# Patient Record
Sex: MALE | Race: WHITE | NOT HISPANIC OR LATINO | Employment: FULL TIME | URBAN - NONMETROPOLITAN AREA
[De-identification: names, ages, dates, MRNs, and addresses within clinical notes are randomized per-mention and may not be internally consistent; named-entity substitution may affect disease eponyms.]

---

## 2017-01-04 ENCOUNTER — OFFICE VISIT (OUTPATIENT)
Dept: OTOLARYNGOLOGY | Facility: CLINIC | Age: 27
End: 2017-01-04

## 2017-01-04 VITALS
WEIGHT: 215 LBS | HEIGHT: 73 IN | BODY MASS INDEX: 28.49 KG/M2 | HEART RATE: 73 BPM | TEMPERATURE: 97.7 F | SYSTOLIC BLOOD PRESSURE: 126 MMHG | DIASTOLIC BLOOD PRESSURE: 65 MMHG | RESPIRATION RATE: 17 BRPM

## 2017-01-04 DIAGNOSIS — M95.0 ACQUIRED NASAL DEFORMITY: Primary | ICD-10-CM

## 2017-01-04 DIAGNOSIS — J34.89 INTRANASAL SYNECHIAE: ICD-10-CM

## 2017-01-04 PROCEDURE — 99024 POSTOP FOLLOW-UP VISIT: CPT | Performed by: OTOLARYNGOLOGY

## 2017-01-04 NOTE — PATIENT INSTRUCTIONS
The left intranasal sneeze she was lysed.  This may recur, but he has had no symptoms.  She developed nasal obstruction, crusting, bleeding, call for follow-up.

## 2017-01-04 NOTE — PROGRESS NOTES
PRIMARY CARE PROVIDER: Ankit Espinal MD  REFERRING PROVIDER: No ref. provider found    Chief Complaint   Patient presents with   • Follow-up     SEPTAL DEVIATION       Subjective   History of Present Illness:  Yves Acevedo is a  26 y.o.  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 The patient has had a relatively normal postoperative course and currently has no related complaints.  Denies pain or nasal obstruction.    Review of Systems:  Review of Systems   HENT: Negative for congestion, nosebleeds and sinus pressure.        Past History:  Past Medical History   Diagnosis Date   • Acquired deformity of nose    • History of migraine    • Hypertrophy of inferior nasal turbinate    • Nasal septal deviation    • Sinus infection      FREQUENT   • Vision decreased      GLASSES     Past Surgical History   Procedure Laterality Date   • Septoplasty     • Tonsillectomy     • Septoplasty, resection inferior turbinates Bilateral 11/15/2016     Procedure: SEPTOPLASTY (69987), RESECTION INFERIOR TURBINATES (99220), REPAIR OF NASAL VESTIBULAR STENOSIS WITH OSTEOTOMIES WITH EVALUATION AND LIKELY REPLACEMENT OF  GRAFTS (48829);  Surgeon: Jersey Verma MD;  Location: [facility] OR;  Service:      History reviewed. No pertinent family history.  Social History   Substance Use Topics   • Smoking status: Never Smoker   • Smokeless tobacco: None   • Alcohol use 3.0 oz/week     5 Cans of beer per week     Allergies:  Review of patient's allergies indicates no known allergies.  No current outpatient prescriptions on file.  No current facility-administered medications for this visit.       Objective     Vital Signs:  Temp:  [97.7 °F (36.5 °C)] 97.7 °F (36.5 °C)  Heart Rate:  [73] 73  Resp:  [17] 17  BP: (126)/(65) 126/65    Physical Exam:  Physical Exam   Constitutional: He is oriented to person, place, and time. He appears well-developed and well-nourished. He is  cooperative. No distress.   HENT:   Head: Normocephalic and atraumatic.   Right Ear: External ear normal.   Left Ear: External ear normal.   Nose: Nose normal.   Mouth/Throat: Oropharynx is clear and moist.   Nasal dorsum slightly off towards the left.  The nose is decongested and suctioned.  The septum is midline with a left-sided intranasal synechia that was lysed with Romero suction and cauterized with silver nitrate.   Eyes: Conjunctivae and EOM are normal. Pupils are equal, round, and reactive to light. Right eye exhibits no discharge. Left eye exhibits no discharge. No scleral icterus.   Neck: Normal range of motion. Neck supple. No JVD present. No tracheal deviation present. No thyromegaly present.   Pulmonary/Chest: Effort normal. No stridor.   Musculoskeletal: Normal range of motion. He exhibits no edema or deformity.   Neurological: He is alert and oriented to person, place, and time. He has normal strength. No cranial nerve deficit. Coordination normal.   Skin: Skin is warm and dry. No rash noted. He is not diaphoretic. No erythema. No pallor.   Psychiatric: He has a normal mood and affect. His speech is normal and behavior is normal. Judgment and thought content normal. Cognition and memory are normal.   Vitals reviewed.        Assessment   Assessment:  1. Acquired nasal deformity    2. Intranasal synechiae        Plan   Plan:      Patient Instructions   The left intranasal sneeze she was lysed.  This may recur, but he has had no symptoms.  She developed nasal obstruction, crusting, bleeding, call for follow-up.    No Follow-up on file.    My findings and recommendations were discussed and questions were answered.     Jersey Verma MD  01/04/17  10:58 AM

## 2017-01-04 NOTE — MR AVS SNAPSHOT
"                        Yves Acevedo   1/4/2017 10:15 AM   Office Visit    Dept Phone:  [phone]   Encounter #:  [id number]    Provider:  Jersey Verma MD   Department:  [facility]                Your Full Care Plan              Today's Medication Changes          These changes are accurate as of: 1/4/17 10:43 AM.  If you have any questions, ask your nurse or doctor.               Stop taking medication(s)listed here:     cephalexin 500 MG capsule   Commonly known as:  KEFLEX   Stopped by:  Jersey Verma MD           CLARITIN 10 MG capsule   Generic drug:  Loratadine   Stopped by:  Jersey Verma MD                      Your Updated Medication List      Notice  As of 1/4/2017 10:43 AM    You have not been prescribed any medications.            Medications to be Given to You by a Medical Professional       Instructions     None    Patient Instructions History      Upcoming Appointments     Visit Type Date Time Department    FOLLOW UP 1/4/2017 10:15 AM [facility]      MyChart Signup     Our records indicate that you have declined [facility] MyChart signup. If you would like to sign up for [facility], please email [email or web address] or call [phone] to obtain an activation code.             Other Info from Your Visit           Allergies     No Known Allergies      Reason for Visit     Follow-up SEPTAL DEVIATION      Vital Signs     Blood Pressure Pulse Temperature Respirations Height Weight    126/65 73 97.7 °F (36.5 °C) 17 73\" (185.4 cm) 215 lb (97.5 kg)    Body Mass Index Smoking Status                28.37 kg/m2 Never Smoker            "

## 2019-10-30 ENCOUNTER — OFFICE VISIT (OUTPATIENT)
Dept: OTOLARYNGOLOGY | Facility: CLINIC | Age: 29
End: 2019-10-30

## 2019-10-30 VITALS
WEIGHT: 222 LBS | RESPIRATION RATE: 16 BRPM | HEART RATE: 126 BPM | HEIGHT: 73 IN | DIASTOLIC BLOOD PRESSURE: 80 MMHG | BODY MASS INDEX: 29.42 KG/M2 | SYSTOLIC BLOOD PRESSURE: 125 MMHG | TEMPERATURE: 97.6 F

## 2019-10-30 DIAGNOSIS — M95.0 ACQUIRED NASAL DEFORMITY: Primary | ICD-10-CM

## 2019-10-30 DIAGNOSIS — J30.9 ALLERGIC RHINITIS, UNSPECIFIED SEASONALITY, UNSPECIFIED TRIGGER: ICD-10-CM

## 2019-10-30 PROCEDURE — 99213 OFFICE O/P EST LOW 20 MIN: CPT | Performed by: OTOLARYNGOLOGY

## 2019-10-30 RX ORDER — SULFAMETHOXAZOLE AND TRIMETHOPRIM 800; 160 MG/1; MG/1
1 TABLET ORAL 2 TIMES DAILY
Refills: 0 | COMMUNITY
Start: 2019-10-28 | End: 2021-07-14

## 2019-10-30 RX ORDER — CITALOPRAM 10 MG/1
10 TABLET ORAL DAILY
COMMUNITY
End: 2023-02-09 | Stop reason: ALTCHOICE

## 2019-10-30 NOTE — PROGRESS NOTES
PRIMARY CARE PROVIDER: Ankit Espinal MD  REFERRING PROVIDER: No ref. provider found    Chief Complaint   Patient presents with   • Facial Injury       Subjective   History of Present Illness:  Yves Acevedo is a  29 y.o. male  who presents with new complaints of recent trauma to the left side of his nose.  On 10/10/19, the patient's 9 month old daughter hit the left side of his nose with her head. He had immediate left sided nasal pain. This was severe in severity. This was worsened with palpation. The pain did improve with time. Two weeks following the injury, he states the left side of his nose and left nasal vestible became very swollen. He then began to have trouble breathing out of the left side of his nose. He thinks he may have caused the swelling of his nose by continued manipulation with Qtips. He was treated with Bactrim and Bactroban with some improvement in symptoms. He has had continued left sided nasal congestion despite taking Flonase and Zyrtec. He states the left sided nasal pain has improved, but it still remains tender. He states he was evaluated with an xray at [facility]- he reports this was normal.     He is s/p repair of nasal vestibular stenosis on 11/15/16.  Postoperatively, he was breathing well.  His nose was a little deflected.  The patient has had a relatively normal postoperative course.     Review of Systems:  Review of Systems   Constitutional: Negative for chills and fever.   HENT: Positive for congestion and facial swelling. Negative for ear pain, nosebleeds, rhinorrhea, sinus pressure and sinus pain.    Respiratory: Negative for cough and shortness of breath.    Gastrointestinal: Negative for diarrhea, nausea and vomiting.   Skin: Negative for wound.   Neurological: Negative for headaches.       Past History:  Past Medical History:   Diagnosis Date   • Acquired deformity of nose    • History of migraine    • Hypertrophy of inferior nasal turbinate    •  Nasal septal deviation    • Sinus infection     FREQUENT   • Vision decreased     GLASSES     Past Surgical History:   Procedure Laterality Date   • SEPTOPLASTY     • SEPTOPLASTY, RESECTION INFERIOR TURBINATES Bilateral 11/15/2016    Procedure: SEPTOPLASTY (03648), RESECTION INFERIOR TURBINATES (41528), REPAIR OF NASAL VESTIBULAR STENOSIS WITH OSTEOTOMIES WITH EVALUATION AND LIKELY REPLACEMENT OF  GRAFTS (07179);  Surgeon: Jersey Verma MD;  Location: [facility];  Service:    • TONSILLECTOMY       Family History   Problem Relation Age of Onset   • Abnormal EKG Mother    • Cancer Father      Social History     Tobacco Use   • Smoking status: Never Smoker   • Smokeless tobacco: Never Used   Substance Use Topics   • Alcohol use: Yes     Alcohol/week: 3.0 oz     Types: 5 Cans of beer per week     Frequency: 2-4 times a month   • Drug use: No     Allergies:  Patient has no known allergies.    Current Outpatient Medications:   •  citalopram (CeleXA) 10 MG tablet, Take 10 mg by mouth Daily., Disp: , Rfl:   •  sulfamethoxazole-trimethoprim (BACTRIM DS,SEPTRA DS) 800-160 MG per tablet, Take 1 tablet by mouth 2 (Two) Times a Day., Disp: , Rfl: 0    I have reviewed and edited the CC/HPI/ROS/PFSH/Allergy/Medication information entered into the note by the patient/ancillary staff to accurately document the details of this visit.    Objective     Vital Signs:  Temp:  [97.6 °F (36.4 °C)] 97.6 °F (36.4 °C)  Heart Rate:  [126] 126  Resp:  [16] 16  BP: (125)/(80) 125/80    Physical Exam:  Physical Exam   Constitutional: He is oriented to person, place, and time. He appears well-developed and well-nourished. He is cooperative. No distress.   HENT:   Head: Normocephalic and atraumatic.   Right Ear: External ear normal.   Left Ear: External ear normal.   Nose: Mucosal edema present.   Mouth/Throat: Uvula is midline and oropharynx is clear and moist.   Nasal dorsum slightly deflected to the left. Columella approximately 1-2 mm  to the right. Left ascending process of the maxilla is moderately swollen and tender to palpation.    Eyes: Conjunctivae, EOM and lids are normal. Pupils are equal, round, and reactive to light. Right eye exhibits no discharge. Left eye exhibits no discharge. No scleral icterus.   Neck: Normal range of motion and phonation normal. Neck supple. No tracheal deviation present.   Pulmonary/Chest: Effort normal. No stridor. No respiratory distress.   Musculoskeletal: Normal range of motion. He exhibits no edema or deformity.   Lymphadenopathy:     He has no cervical adenopathy.   Neurological: He is alert and oriented to person, place, and time. He has normal strength. No cranial nerve deficit. Coordination normal.   Skin: Skin is warm and dry. No lesion and no rash noted. He is not diaphoretic. No erythema. No pallor.   Psychiatric: He has a normal mood and affect. His speech is normal and behavior is normal. Judgment and thought content normal. Cognition and memory are normal.   Nursing note and vitals reviewed.      Assessment   Assessment:  1. Acquired nasal deformity    2. Allergic rhinitis, unspecified seasonality, unspecified trigger        Plan   Plan:    I would like to give this more time for his nose to heal and for the swelling to continue to decrease. He was instructed to continue Bactroban and Flonase. I would like for him to follow-up in 4 weeks for re-evaluation. His breathing is improving as the swelling and pain go down.  Call for any problems or worsening symptoms.     Return in about 4 weeks (around 11/27/2019), or if symptoms worsen or fail to improve, for Recheck.    My findings and recommendations were discussed and questions were answered.     Jersey Verma MD  10/30/19  5:38 PM

## 2021-06-15 ENCOUNTER — HOSPITAL ENCOUNTER (OUTPATIENT)
Dept: MRI IMAGING | Facility: HOSPITAL | Age: 31
Discharge: HOME | End: 2021-06-15
Admitting: INTERNAL MEDICINE

## 2021-06-15 DIAGNOSIS — H53.2 DOUBLE VISION: ICD-10-CM

## 2021-06-15 DIAGNOSIS — R51.9 NONINTRACTABLE HEADACHE, UNSPECIFIED CHRONICITY PATTERN, UNSPECIFIED HEADACHE TYPE: ICD-10-CM

## 2021-06-15 PROCEDURE — 25010000002 GADOTERIDOL PER 1 ML: Performed by: INTERNAL MEDICINE

## 2021-06-15 PROCEDURE — 70553 MRI BRAIN STEM W/O & W/DYE: CPT

## 2021-06-15 PROCEDURE — A9579 GAD-BASE MR CONTRAST NOS,1ML: HCPCS | Performed by: INTERNAL MEDICINE

## 2021-06-15 RX ADMIN — GADOTERIDOL 20 ML: 279.3 INJECTION, SOLUTION INTRAVENOUS at 12:31

## 2021-07-14 ENCOUNTER — APPOINTMENT (OUTPATIENT)
Dept: CT IMAGING | Facility: HOSPITAL | Age: 31
End: 2021-07-14

## 2021-07-14 ENCOUNTER — HOSPITAL ENCOUNTER (EMERGENCY)
Facility: HOSPITAL | Age: 31
Discharge: HOME | End: 2021-07-14
Attending: FAMILY MEDICINE | Admitting: EMERGENCY MEDICINE

## 2021-07-14 VITALS
BODY MASS INDEX: 30.62 KG/M2 | TEMPERATURE: 96.9 F | SYSTOLIC BLOOD PRESSURE: 126 MMHG | HEIGHT: 73 IN | DIASTOLIC BLOOD PRESSURE: 83 MMHG | WEIGHT: 231 LBS | OXYGEN SATURATION: 100 % | HEART RATE: 67 BPM | RESPIRATION RATE: 18 BRPM

## 2021-07-14 DIAGNOSIS — J01.00 ACUTE MAXILLARY SINUSITIS, RECURRENCE NOT SPECIFIED: Primary | ICD-10-CM

## 2021-07-14 DIAGNOSIS — G43.109 MIGRAINE WITH AURA AND WITHOUT STATUS MIGRAINOSUS, NOT INTRACTABLE: ICD-10-CM

## 2021-07-14 LAB
ALBUMIN SERPL-MCNC: 4.7 G/DL (ref 3.5–5.2)
ALBUMIN/GLOB SERPL: 1.4 G/DL
ALP SERPL-CCNC: 99 U/L (ref 39–117)
ALT SERPL W P-5'-P-CCNC: 32 U/L (ref 1–41)
AMPHET+METHAMPHET UR QL: NEGATIVE
AMPHETAMINES UR QL: NEGATIVE
ANION GAP SERPL CALCULATED.3IONS-SCNC: 5 MMOL/L (ref 5–15)
AST SERPL-CCNC: 21 U/L (ref 1–40)
BARBITURATES UR QL SCN: NEGATIVE
BASOPHILS # BLD AUTO: 0.03 10*3/MM3 (ref 0–0.2)
BASOPHILS NFR BLD AUTO: 0.4 % (ref 0–1.5)
BENZODIAZ UR QL SCN: NEGATIVE
BILIRUB SERPL-MCNC: 0.4 MG/DL (ref 0–1.2)
BUN SERPL-MCNC: 17 MG/DL (ref 6–20)
BUN/CREAT SERPL: 13.8 (ref 7–25)
BUPRENORPHINE SERPL-MCNC: NEGATIVE NG/ML
CALCIUM SPEC-SCNC: 9.3 MG/DL (ref 8.6–10.5)
CANNABINOIDS SERPL QL: NEGATIVE
CHLORIDE SERPL-SCNC: 98 MMOL/L (ref 98–107)
CO2 SERPL-SCNC: 30 MMOL/L (ref 22–29)
COCAINE UR QL: NEGATIVE
CREAT SERPL-MCNC: 1.23 MG/DL (ref 0.76–1.27)
DEPRECATED RDW RBC AUTO: 36.7 FL (ref 37–54)
EOSINOPHIL # BLD AUTO: 0.24 10*3/MM3 (ref 0–0.4)
EOSINOPHIL NFR BLD AUTO: 3.3 % (ref 0.3–6.2)
ERYTHROCYTE [DISTWIDTH] IN BLOOD BY AUTOMATED COUNT: 12.6 % (ref 12.3–15.4)
GFR SERPL CREATININE-BSD FRML MDRD: 68 ML/MIN/1.73
GLOBULIN UR ELPH-MCNC: 3.4 GM/DL
GLUCOSE SERPL-MCNC: 87 MG/DL (ref 65–99)
HCT VFR BLD AUTO: 43.9 % (ref 37.5–51)
HGB BLD-MCNC: 15 G/DL (ref 13–17.7)
HOLD SPECIMEN: NORMAL
IMM GRANULOCYTES # BLD AUTO: 0.03 10*3/MM3 (ref 0–0.05)
IMM GRANULOCYTES NFR BLD AUTO: 0.4 % (ref 0–0.5)
LYMPHOCYTES # BLD AUTO: 2.38 10*3/MM3 (ref 0.7–3.1)
LYMPHOCYTES NFR BLD AUTO: 33 % (ref 19.6–45.3)
MCH RBC QN AUTO: 27.8 PG (ref 26.6–33)
MCHC RBC AUTO-ENTMCNC: 34.2 G/DL (ref 31.5–35.7)
MCV RBC AUTO: 81.3 FL (ref 79–97)
METHADONE UR QL SCN: NEGATIVE
MONOCYTES # BLD AUTO: 0.93 10*3/MM3 (ref 0.1–0.9)
MONOCYTES NFR BLD AUTO: 12.9 % (ref 5–12)
NEUTROPHILS NFR BLD AUTO: 3.6 10*3/MM3 (ref 1.7–7)
NEUTROPHILS NFR BLD AUTO: 50 % (ref 42.7–76)
NRBC BLD AUTO-RTO: 0 /100 WBC (ref 0–0.2)
OPIATES UR QL: NEGATIVE
OXYCODONE UR QL SCN: NEGATIVE
PCP UR QL SCN: NEGATIVE
PLATELET # BLD AUTO: 291 10*3/MM3 (ref 140–450)
PMV BLD AUTO: 10.3 FL (ref 6–12)
POTASSIUM SERPL-SCNC: 4.1 MMOL/L (ref 3.5–5.2)
PROPOXYPH UR QL: NEGATIVE
PROT SERPL-MCNC: 8.1 G/DL (ref 6–8.5)
RBC # BLD AUTO: 5.4 10*6/MM3 (ref 4.14–5.8)
SODIUM SERPL-SCNC: 133 MMOL/L (ref 136–145)
TRICYCLICS UR QL SCN: NEGATIVE
WBC # BLD AUTO: 7.21 10*3/MM3 (ref 3.4–10.8)

## 2021-07-14 PROCEDURE — 70450 CT HEAD/BRAIN W/O DYE: CPT

## 2021-07-14 PROCEDURE — 99283 EMERGENCY DEPT VISIT LOW MDM: CPT

## 2021-07-14 PROCEDURE — 80306 DRUG TEST PRSMV INSTRMNT: CPT | Performed by: PHYSICIAN ASSISTANT

## 2021-07-14 PROCEDURE — 85025 COMPLETE CBC W/AUTO DIFF WBC: CPT | Performed by: PHYSICIAN ASSISTANT

## 2021-07-14 PROCEDURE — 80053 COMPREHEN METABOLIC PANEL: CPT | Performed by: PHYSICIAN ASSISTANT

## 2021-07-14 PROCEDURE — 70486 CT MAXILLOFACIAL W/O DYE: CPT

## 2021-07-14 RX ORDER — LEVOFLOXACIN 750 MG/1
750 TABLET ORAL DAILY
Qty: 7 TABLET | Refills: 0 | Status: SHIPPED | OUTPATIENT
Start: 2021-07-14 | End: 2021-07-21

## 2021-07-14 NOTE — ED PROVIDER NOTES
"Subjective   Patient was sent to the emergency department for sinus infection, AMS.  States he has a history of migraine with aura since 2008 where he is conscious and can hear/understand what is going on around him but is unable to act.  Describes this as \"it feels as if my body is shutting down.  He has had 3 episodes similar to this in the past 3 months lasting approximately 10-15 minutes a piece.  Patient endorses headache, bilateral sinus pressure, feeling something pop in his sinuses and leaking orange fluid from his nose since yesterday.  Hx of sinus surgery.  Recent normal brain MRI on 6/15/2021.  He recently moved here and has not established with Neurology/ENT.  He has an appointment with Dr Wylie 9/15/2021.        History provided by:  Patient   used: No    Sinusitis  Pain details:     Location:  Maxillary (right)    Duration:  2 days    Timing:  Constant  Associated symptoms: headaches and rhinorrhea    Associated symptoms: no chills, no fever, no shortness of breath, no sore throat and no wheezing        Review of Systems   Constitutional: Negative for chills and fever.   HENT: Positive for rhinorrhea, sinus pressure and sinus pain. Negative for sore throat and trouble swallowing.    Respiratory: Negative for shortness of breath and wheezing.    Genitourinary: Negative for dysuria and flank pain.   Musculoskeletal: Negative for back pain.   Skin: Negative for color change.   Allergic/Immunologic: Negative for immunocompromised state.   Neurological: Positive for headaches. Negative for syncope.   Hematological: Does not bruise/bleed easily.   Psychiatric/Behavioral: Negative for confusion.       Past Medical History:   Diagnosis Date   • Acquired deformity of nose    • History of migraine    • Hypertrophy of inferior nasal turbinate    • Nasal septal deviation    • Sinus infection     FREQUENT   • Vision decreased     GLASSES       No Known Allergies    Past Surgical History: " "  Procedure Laterality Date   • SEPTOPLASTY     • SEPTOPLASTY, RESECTION INFERIOR TURBINATES Bilateral 11/15/2016    Procedure: SEPTOPLASTY (73020), RESECTION INFERIOR TURBINATES (15879), REPAIR OF NASAL VESTIBULAR STENOSIS WITH OSTEOTOMIES WITH EVALUATION AND LIKELY REPLACEMENT OF  GRAFTS (60880);  Surgeon: Jersey Verma MD;  Location: [facility] OR;  Service:    • TONSILLECTOMY         Family History   Problem Relation Age of Onset   • Abnormal EKG Mother    • Cancer Father        Social History     Socioeconomic History   • Marital status:      Spouse name: Not on file   • Number of children: Not on file   • Years of education: Not on file   • Highest education level: Not on file   Tobacco Use   • Smoking status: Never Smoker   • Smokeless tobacco: Never Used   Substance and Sexual Activity   • Alcohol use: Yes     Alcohol/week: 5.0 standard drinks     Types: 5 Cans of beer per week   • Drug use: Yes     Types: Marijuana   • Sexual activity: Defer           Objective      /89 (BP Location: Right arm, Patient Position: Sitting)   Pulse 62   Temp 96.9 °F (36.1 °C) (Infrared)   Resp 18   Ht 185.4 cm (73\")   Wt 105 kg (231 lb)   SpO2 98%   BMI 30.48 kg/m²     Physical Exam  Vitals and nursing note reviewed.   Constitutional:       Appearance: Normal appearance.   HENT:      Head: Normocephalic and atraumatic.      Nose:      Comments: Dried yellow/orange color noted in surgical mask.  No halo.       Mouth/Throat:      Mouth: Mucous membranes are moist.   Eyes:      Conjunctiva/sclera: Conjunctivae normal.   Cardiovascular:      Rate and Rhythm: Normal rate.   Pulmonary:      Effort: Pulmonary effort is normal.   Skin:     General: Skin is warm.      Capillary Refill: Capillary refill takes less than 2 seconds.   Neurological:      General: No focal deficit present.      Mental Status: He is alert.   Psychiatric:         Mood and Affect: Mood normal.         Behavior: Behavior normal.         " Thought Content: Thought content normal.         Procedures           ED Course  ED Course as of Jul 14 2004 Wed Jul 14, 2021 1948 Patient's sinus drainage/tenderness started end of May (>28 days)  He is a treatment failure on Ceftin prescribed by PCP 2 weeks ago (not within 10 days of onset of symptoms).  Treatment appropriate according to MIPS criteria.      [HIEN]      ED Course User Index  [HIEN] Boris Valentino PA-C      Results for orders placed or performed during the hospital encounter of 07/14/21   Comprehensive Metabolic Panel    Specimen: Blood   Result Value Ref Range    Glucose 87 65 - 99 mg/dL    BUN 17 6 - 20 mg/dL    Creatinine 1.23 0.76 - 1.27 mg/dL    Sodium 133 (L) 136 - 145 mmol/L    Potassium 4.1 3.5 - 5.2 mmol/L    Chloride 98 98 - 107 mmol/L    CO2 30.0 (H) 22.0 - 29.0 mmol/L    Calcium 9.3 8.6 - 10.5 mg/dL    Total Protein 8.1 6.0 - 8.5 g/dL    Albumin 4.70 3.50 - 5.20 g/dL    ALT (SGPT) 32 1 - 41 U/L    AST (SGOT) 21 1 - 40 U/L    Alkaline Phosphatase 99 39 - 117 U/L    Total Bilirubin 0.4 0.0 - 1.2 mg/dL    eGFR Non African Amer 68 >60 mL/min/1.73    Globulin 3.4 gm/dL    A/G Ratio 1.4 g/dL    BUN/Creatinine Ratio 13.8 7.0 - 25.0    Anion Gap 5.0 5.0 - 15.0 mmol/L   Urine Drug Screen - Urine, Clean Catch    Specimen: Urine, Clean Catch   Result Value Ref Range    THC, Screen, Urine Negative Negative    Phencyclidine (PCP), Urine Negative Negative    Cocaine Screen, Urine Negative Negative    Methamphetamine, Ur Negative Negative    Opiate Screen Negative Negative    Amphetamine Screen, Urine Negative Negative    Benzodiazepine Screen, Urine Negative Negative    Tricyclic Antidepressants Screen Negative Negative    Methadone Screen, Urine Negative Negative    Barbiturates Screen, Urine Negative Negative    Oxycodone Screen, Urine Negative Negative    Propoxyphene Screen Negative Negative    Buprenorphine, Screen, Urine Negative Negative   CBC Auto Differential    Specimen: Blood    Result Value Ref Range    WBC 7.21 3.40 - 10.80 10*3/mm3    RBC 5.40 4.14 - 5.80 10*6/mm3    Hemoglobin 15.0 13.0 - 17.7 g/dL    Hematocrit 43.9 37.5 - 51.0 %    MCV 81.3 79.0 - 97.0 fL    MCH 27.8 26.6 - 33.0 pg    MCHC 34.2 31.5 - 35.7 g/dL    RDW 12.6 12.3 - 15.4 %    RDW-SD 36.7 (L) 37.0 - 54.0 fl    MPV 10.3 6.0 - 12.0 fL    Platelets 291 140 - 450 10*3/mm3    Neutrophil % 50.0 42.7 - 76.0 %    Lymphocyte % 33.0 19.6 - 45.3 %    Monocyte % 12.9 (H) 5.0 - 12.0 %    Eosinophil % 3.3 0.3 - 6.2 %    Basophil % 0.4 0.0 - 1.5 %    Immature Grans % 0.4 0.0 - 0.5 %    Neutrophils, Absolute 3.60 1.70 - 7.00 10*3/mm3    Lymphocytes, Absolute 2.38 0.70 - 3.10 10*3/mm3    Monocytes, Absolute 0.93 (H) 0.10 - 0.90 10*3/mm3    Eosinophils, Absolute 0.24 0.00 - 0.40 10*3/mm3    Basophils, Absolute 0.03 0.00 - 0.20 10*3/mm3    Immature Grans, Absolute 0.03 0.00 - 0.05 10*3/mm3    nRBC 0.0 0.0 - 0.2 /100 WBC     CT Head Without Contrast    Result Date: 7/14/2021  Narrative: CT HEAD WO CONTRAST (accession [id number]), CT SINUS WO CONTRAST (accession [id number]) RadLex: CT HEAD WITHOUT IV CONTRAST, CT SINUSES WITHOUT IV CONTRAST CLINICAL HISTORY: 31 years  Male;  headache TECHNOLOGIST NOTES: TECHNIQUE: Helical CT imaging performed without contrast. Axial, sagittal and coronal reformatted images are available for review.  This exam was performed according to our departmental dose-optimization program, which includes automated exposure control, adjustment of the mA and/or kV according to patient size and/or use of iterative reconstruction technique. COMPARISON: None currently available FINDINGS: Head: No acute intracranial hemorrhage. No mass effect, midline shift or hydrocephalus. The gray-white matter differentiation is grossly unremarkable. No evidence of acute large territory infarct.   Within the broad range of normal, brain volume is age appropriate. Sinuses: Moderately severe peripheral mucosal thickening in the maxillary  sinuses bilaterally. There is also mucosal thickening in the ethmoid sinuses bilaterally and the left frontal sinus. Nondisplaced bilateral nasal bone fractures, probably old. The mastoids are clear bilaterally.     Impression: 1.  Head: No evidence of acute traumatic intracranial injury 2.  Sinuses:  Peripheral mucosal thickening in the paranasal sinuses bilaterally. Electronically signed by:  Kunal Miller MD  7/14/2021 7:33 PM CDT Workstation: [id number]    MRI Brain With & Without Contrast    Result Date: 6/15/2021  Narrative: EXAM: MRI BRAIN W WO CONTRAST CLINICAL INDICATION: Headache COMPARISON: There is no previous study for comparison. TECHNIQUE: The MRI was done using sagittal T1-weighted images, axial T1-weighted, T2-weighted, FLAIR, diffusion-weighted, and gradient echo images, coronal T2, and postcontrast triplanar T1-weighted images including IV administration of 20 cc of ProHance.  FINDINGS: There is no midline shift, mass effect, or extra-axial fluid collection. There is no evidence of acute intracranial hemorrhage. The ventricles and cortical sulci are normal for the patient's age. Diffusion-weighted images are negative with no evidence of restricted diffusion to suggest acute infarct. The major intracranial flow voids are intact. Postcontrast images reveal no evidence of any mass lesion or abnormal postcontrast enhancement. Incidentally noted is mild mucosal thickening in the bilateral maxillary sinuses and left frontal sinus.     Impression: 1. Normal MRI of the brain. 2. Incidentally noted mild chronic sinus disease.  Electronically signed by:  Jan Roy MD  6/15/2021 7:33 PM CDT Workstation: [id number]    CT Sinus Without Contrast    Result Date: 7/14/2021  Narrative: CT HEAD WO CONTRAST (accession [id number]), CT SINUS WO CONTRAST (accession [id number]) RadLex: CT HEAD WITHOUT IV CONTRAST, CT SINUSES WITHOUT IV CONTRAST CLINICAL HISTORY: 31 years  Male;  headache TECHNOLOGIST NOTES: TECHNIQUE:  Helical CT imaging performed without contrast. Axial, sagittal and coronal reformatted images are available for review.  This exam was performed according to our departmental dose-optimization program, which includes automated exposure control, adjustment of the mA and/or kV according to patient size and/or use of iterative reconstruction technique. COMPARISON: None currently available FINDINGS: Head: No acute intracranial hemorrhage. No mass effect, midline shift or hydrocephalus. The gray-white matter differentiation is grossly unremarkable. No evidence of acute large territory infarct.   Within the broad range of normal, brain volume is age appropriate. Sinuses: Moderately severe peripheral mucosal thickening in the maxillary sinuses bilaterally. There is also mucosal thickening in the ethmoid sinuses bilaterally and the left frontal sinus. Nondisplaced bilateral nasal bone fractures, probably old. The mastoids are clear bilaterally.     Impression: 1.  Head: No evidence of acute traumatic intracranial injury 2.  Sinuses:  Peripheral mucosal thickening in the paranasal sinuses bilaterally. Electronically signed by:  Kunal Miller MD  7/14/2021 7:33 PM CDT Workstation: [id number]                                         [facility]    Final diagnoses:   Acute maxillary sinusitis, recurrence not specified   Migraine with aura and without status migrainosus, not intractable       ED Disposition  ED Disposition     ED Disposition Condition Comment    Discharge Stable           Ankit Espinal MD  [address] [place] [address]   [place] [address]  [phone]    Call in 1 day      [facility] EAR [facility]  [address] [place] [address] Dr  [facility] [address] [place] [id number] [phone]  Call in 1 day           Medication List      New Prescriptions    levoFLOXacin 750 MG tablet  Commonly known as: LEVAQUIN  Take 1 tablet by mouth Daily for 7 days.           Where to Get Your Medications      These  medications were sent to [facility][id number] - [place], [place] - [address] AT [facility] [address] - [phone]  - [phone] [address] [address]    Phone: [phone]   · levoFLOXacin 750 MG tablet          Boris Valentino PA-C  07/14/21 2004

## 2021-07-26 ENCOUNTER — OFFICE VISIT (OUTPATIENT)
Dept: OTOLARYNGOLOGY | Facility: CLINIC | Age: 31
End: 2021-07-26

## 2021-07-26 VITALS — OXYGEN SATURATION: 97 % | WEIGHT: 231.4 LBS | HEIGHT: 73 IN | BODY MASS INDEX: 30.67 KG/M2

## 2021-07-26 DIAGNOSIS — J32.4 CHRONIC PANSINUSITIS: Primary | ICD-10-CM

## 2021-07-26 PROCEDURE — 31231 NASAL ENDOSCOPY DX: CPT | Performed by: OTOLARYNGOLOGY

## 2021-07-26 RX ORDER — FLUTICASONE PROPIONATE 50 MCG
2 SPRAY, SUSPENSION (ML) NASAL DAILY
COMMUNITY
End: 2023-02-09 | Stop reason: ALTCHOICE

## 2021-07-26 RX ORDER — CETIRIZINE HYDROCHLORIDE 10 MG/1
10 TABLET ORAL DAILY
COMMUNITY
End: 2023-02-09 | Stop reason: ALTCHOICE

## 2021-08-23 ENCOUNTER — OFFICE VISIT (OUTPATIENT)
Dept: SLEEP MEDICINE | Facility: HOSPITAL | Age: 31
End: 2021-08-23

## 2021-08-23 VITALS
DIASTOLIC BLOOD PRESSURE: 76 MMHG | HEART RATE: 58 BPM | BODY MASS INDEX: 30.44 KG/M2 | OXYGEN SATURATION: 97 % | WEIGHT: 229.7 LBS | SYSTOLIC BLOOD PRESSURE: 120 MMHG | HEIGHT: 73 IN

## 2021-08-23 DIAGNOSIS — G47.10 HYPERSOMNIA: ICD-10-CM

## 2021-08-23 DIAGNOSIS — R06.83 SNORES: Primary | ICD-10-CM

## 2021-08-23 PROCEDURE — 99203 OFFICE O/P NEW LOW 30 MIN: CPT | Performed by: INTERNAL MEDICINE

## 2021-08-23 NOTE — PROGRESS NOTES
"      [facility]  [address]  [place], [place] [address]  Phone: [phone]  Fax: [phone]      New Patient Sleep Medicine Consultation  Sleep Visit Only    Encounter Date: 8/23/2021         Patient's PCP: Ankit Espinal MD  Referring provider: Clint Lee III, MD  Reason for consultation chief complaint: SATNAM    CHIEF COMPLAINT:  :   Chief Complaint   Patient presents with   • Fatigue     neck----16.5\"   • Daytime Sleepiness   • Morning Headaches   • Dry Mouth   • Snoring        Encounter Date: 8/23/2021           HISTORY OF PRESENT ILLNESS:  Referred by ENT, Dr. Keith RIDLEY for Sleep Evaluation for SATNAM   30 Years old man History of Chronic Sinus Disease since 2007, Seen by 4 ENT Physicians, patient with History 2 sinus procedures, some improvement as per patient information.  Patient with History of sinus infection with 3 courses of antibiotics last 3 months, Patient denied having nasal swabs for Infection testing.     Patient with a longstanding history of sinus disease and nasal vestibular stenosis, who had history of nasal septoplasty and multiple nasal procedures and seen by for ENT so far, patient with a family history of SATNAM, patient was referred for a sleep apnea evaluation.    Yves Acevedo is a 31 y.o. male whose bedtime is ~ 12 am. He  falls asleep after 10 minutes, and is up 1 times per night. He wakes up ~ 7 am. He endorses 7 hours of sleep. He drinks 1 cups of coffee, No hot teas, and 1 sodas per day. He drinks 3-4 alcoholic beverages per week. Patient sleeps with wife.    Yves Acevedo admits to snoring, unrestful sleep, excessive daytime sleepiness, morning headaches and sleepy driving. He denies cataplexy, sleep paralysis, or hypnagogic hallucinations. He does not take sedatives or hypnotics. He has some sleepiness with driving.he ahs never falling sleep while driving. He Does not nap due to lack of time. He has carpets in His " bedroom, No animals.  Patient exercises 3 times a week. Patient has never been tested for allergies.     Patient has a history Migraine, patient denies history of DVT, no history of pulmonary embolism, no history of asthma, no history of pneumonia.  Patient has carpets in his room and carpets is vacuumed once a week  Patient has 4 kids at home and he wakes up and help taking care of the kids    Family History SATNAM: father, Brother, uncles, Aunts    Brief Social History:  He is a never smoker.   Nicotine vaping: No  No POT smoker: Sometimes     Occupation:  Business Owner    PAST MEDICAL AND SURGICAL HISTORIES:    Past Medical History:   Diagnosis Date   • Acquired deformity of nose    • History of migraine    • Hypertrophy of inferior nasal turbinate    • Nasal septal deviation    • Sinus infection     FREQUENT   • Vision decreased     GLASSES     Past Surgical History:   Procedure Laterality Date   • SEPTOPLASTY     • SEPTOPLASTY, RESECTION INFERIOR TURBINATES Bilateral 11/15/2016    Procedure: SEPTOPLASTY (24548), RESECTION INFERIOR TURBINATES (92639), REPAIR OF NASAL VESTIBULAR STENOSIS WITH OSTEOTOMIES WITH EVALUATION AND LIKELY REPLACEMENT OF  GRAFTS (97548);  Surgeon: Jersey Verma MD;  Location: [facility] OR;  Service:    • TONSILLECTOMY         No Known Allergies      Family History   Problem Relation Age of Onset   • Abnormal EKG Mother    • Cancer Father      Social History     Socioeconomic History   • Marital status:      Spouse name: Not on file   • Number of children: Not on file   • Years of education: Not on file   • Highest education level: Not on file   Tobacco Use   • Smoking status: Never Smoker   • Smokeless tobacco: Never Used   Substance and Sexual Activity   • Alcohol use: Yes     Alcohol/week: 5.0 standard drinks     Types: 5 Cans of beer per week   • Drug use: Yes     Types: Marijuana   • Sexual activity: Defer     MEDICATIONS:    Current Outpatient Medications:   •  cetirizine  "(zyrTEC) 10 MG tablet, Take 10 mg by mouth Daily., Disp: , Rfl:   •  citalopram (CeleXA) 10 MG tablet, Take 10 mg by mouth Daily., Disp: , Rfl:   •  fluticasone (FLONASE) 50 MCG/ACT nasal spray, 2 sprays into the nostril(s) as directed by provider Daily., Disp: , Rfl:   •  ubrogepant (ubrogepant) 50 MG tablet, as needed, Disp: , Rfl:       Review of Systems   Constitutional: Positive for fatigue.   HENT: Positive for congestion, postnasal drip, sinus pressure and sinus pain. Negative for sneezing.    Eyes: Negative for pain.   Respiratory: Negative for shortness of breath.    Cardiovascular: Negative for leg swelling.   Gastrointestinal: Negative for blood in stool.   Endocrine: Negative for polydipsia.   Genitourinary: Negative for enuresis.   Musculoskeletal: Negative for arthralgias.   Skin: Negative for rash.   Allergic/Immunologic: Negative for environmental allergies.   Neurological: Negative for seizures.   Hematological: Does not bruise/bleed easily.   Psychiatric/Behavioral: Positive for sleep disturbance. Negative for self-injury.       OBJECTIVE:    Vitals:    08/23/21 0822   BP: 120/76   Pulse: 58   SpO2: 97%         08/23/21 0822   Weight: 104 kg (229 lb 11.2 oz)     Body mass index is 30.31 kg/m². Patient's Body mass index is 30.31 kg/m².    Neck circumference: 16.5\"  ESS: 12      PHYSICAL EXAM:          General: Alert. Cooperative. No acute distress.             Head:  Normocephalic. Symmetrical. Atraumatic.              Eyes: Sclera clear. No icterus.              Nose: Mild septal deviation. No drainage. No nasal Polyps, No Skin Ulcers             Neck:  Trachea midline, No Stridor, No Supraclavicular Adenopathy          Throat: No oral lesions. No thrush. Moist mucous membranes.    Tongue is normal    Dentition is good       Pharynx: Posterior pharyngeal pillars are narrow    Mallampati score of IV (only hard palate visible)    Pharynx is nonerythematous                     Lungs:  Clear to " auscultation bilaterally. No wheezes. No rhonchi. No rales. Respirations regular, even and unlabored.            Heart:  Regular rhythm and normal rate. Normal S1 and S2. No murmur.     Abdomen:  Soft, non-tender and non-distended. Normal bowel sounds. No masses.   Extremities:  No edema, no Clubbing, No Cyanosis, upper extremity pulses palpable              Skin: No jaundice or rash, no nasal bridge ulceration           Neuro: Moves all 4 extremities  Psychiatric: Normal mood and affect.      IMPRESSION AND RECOMMENDATIONS:  Pleasant 31-year-old gentleman referred by ENT for SATNAM evaluation, patient with a longstanding history of chronic sinus disease seen by ENT, patient nasal corticosteroids, family history of SATNAM, [place] Sleepiness Scale of 12.     1. Snores  - I have explained to the patient the relationship with chronic sinus disease, upper airway resistance syndrome and possible worsening of underlying SATNAM  - I will request home sleep studies and if it is nondiagnostic I will let ENT know to continue with his ENT management  - Home Sleep Study; Future    2. Hypersomnia  - We will request a home sleep study  - Home Sleep Study; Future    Printed information regarding SATNAM facts has been provided to the patient    Contraindications to home sleep test: none    FOLLOW UP:  Return in about 4 weeks (around 9/20/2021).     Thank you for letting us to participate in the care of your patient.        Marquise Bang MD, FACP, FCCP  Diplomate in Pulmonary & Sleep Medicine    This document has been electronically signed by Marquise Bang MD on August 23, 2021 09:15 CDT      Instructions provided as documented in the After Visit Summary.  The patient indicated understanding of the diagnosis and agreed with the plan of care    EMR Dragon/Transcription disclaimer:   Some of this note may be an electronic transcription/translation of spoken language to printed text. The electronic translation of spoken language may  permit erroneous, or at times, nonsensical words or phrases to be inadvertently transcribed; Although I have reviewed the note for such errors, some may still exist.      CC: Ankit Espinal MD          No ref. provider found

## 2021-09-08 ENCOUNTER — HOSPITAL ENCOUNTER (OUTPATIENT)
Dept: SLEEP MEDICINE | Facility: HOSPITAL | Age: 31
Discharge: HOME | End: 2021-09-08
Admitting: INTERNAL MEDICINE

## 2021-09-08 DIAGNOSIS — R06.83 SNORES: ICD-10-CM

## 2021-09-08 DIAGNOSIS — G47.10 HYPERSOMNIA: ICD-10-CM

## 2021-09-08 PROCEDURE — 95800 SLP STDY UNATTENDED: CPT

## 2021-09-08 PROCEDURE — 95800 SLP STDY UNATTENDED: CPT | Performed by: INTERNAL MEDICINE

## 2021-09-22 ENCOUNTER — OFFICE VISIT (OUTPATIENT)
Dept: SLEEP MEDICINE | Facility: HOSPITAL | Age: 31
End: 2021-09-22

## 2021-09-22 VITALS
WEIGHT: 231.6 LBS | BODY MASS INDEX: 30.56 KG/M2 | DIASTOLIC BLOOD PRESSURE: 71 MMHG | HEART RATE: 67 BPM | SYSTOLIC BLOOD PRESSURE: 122 MMHG | OXYGEN SATURATION: 98 %

## 2021-09-22 DIAGNOSIS — G47.33 OSA (OBSTRUCTIVE SLEEP APNEA): Primary | ICD-10-CM

## 2021-09-22 PROCEDURE — 99212 OFFICE O/P EST SF 10 MIN: CPT | Performed by: NURSE PRACTITIONER

## 2021-09-22 NOTE — PATIENT INSTRUCTIONS
Obesity, Adult  Obesity is the condition of having too much total body fat. Being overweight or obese means that your weight is greater than what is considered healthy for your body size. Obesity is determined by a measurement called BMI. BMI is an estimate of body fat and is calculated from height and weight. For adults, a BMI of 30 or higher is considered obese.  Obesity can lead to other health concerns and major illnesses, including:  · Stroke.  · Coronary artery disease (CAD).  · Type 2 diabetes.  · Some types of cancer, including cancers of the colon, breast, uterus, and gallbladder.  · Osteoarthritis.  · High blood pressure (hypertension).  · High cholesterol.  · Sleep apnea.  · Gallbladder stones.  · Infertility problems.  What are the causes?  Common causes of this condition include:  · Eating daily meals that are high in calories, sugar, and fat.  · Being born with genes that may make you more likely to become obese.  · Having a medical condition that causes obesity, including:  ? Hypothyroidism.  ? Polycystic ovarian syndrome (PCOS).  ? Binge-eating disorder.  ? Cushing syndrome.  · Taking certain medicines, such as steroids, antidepressants, and seizure medicines.  · Not being physically active (sedentary lifestyle).  · Not getting enough sleep.  · Drinking high amounts of sugar-sweetened beverages, such as soft drinks.  What increases the risk?  The following factors may make you more likely to develop this condition:  · Having a family history of obesity.  · Being a woman of  descent.  · Being a man of  descent.  · Living in an area with limited access to:  ? [place], recreation centers, or sidewalks.  ? Healthy food choices, such as grocery stores and farmers' markets.  What are the signs or symptoms?  The main sign of this condition is having too much body fat.  How is this diagnosed?  This condition is diagnosed based on:  · Your BMI. If you are an adult with a BMI of 30 or  higher, you are considered obese.  · Your waist circumference. This measures the distance around your waistline.  · Your skinfold thickness. Your health care provider may gently pinch a fold of your skin and measure it.  You may have other tests to check for underlying conditions.  How is this treated?  Treatment for this condition often includes changing your lifestyle. Treatment may include some or all of the following:  · Dietary changes. This may include developing a healthy meal plan.  · Regular physical activity. This may include activity that causes your heart to beat faster (aerobic exercise) and strength training. Work with your health care provider to design an exercise program that works for you.  · Medicine to help you lose weight if you are unable to lose 1 pound a week after 6 weeks of healthy eating and more physical activity.  · Treating conditions that cause the obesity (underlying conditions).  · Surgery. Surgical options may include gastric banding and gastric bypass. Surgery may be done if:  ? Other treatments have not helped to improve your condition.  ? You have a BMI of 40 or higher.  ? You have life-threatening health problems related to obesity.  Follow these instructions at home:  Eating and drinking    · Follow recommendations from your health care provider about what you eat and drink. Your health care provider may advise you to:  ? Limit fast food, sweets, and processed snack foods.  ? Choose low-fat options, such as low-fat milk instead of whole milk.  ? Eat 5 or more servings of fruits or vegetables every day.  ? Eat at home more often. This gives you more control over what you eat.  ? Choose healthy foods when you eat out.  ? Learn to read food labels. This will help you understand how much food is considered 1 serving.  ? Learn what a healthy serving size is.  ? Keep low-fat snacks available.  ? Limit sugary drinks, such as soda, fruit juice, sweetened iced tea, and flavored  milk.  · Drink enough water to keep your urine pale yellow.  · Do not follow a fad diet. Fad diets can be unhealthy and even dangerous.    Physical activity  · Exercise regularly, as told by your health care provider.  ? Most adults should get up to 150 minutes of moderate-intensity exercise every week.  ? Ask your health care provider what types of exercise are safe for you and how often you should exercise.  · Warm up and stretch before being active.  · Cool down and stretch after being active.  · Rest between periods of activity.  Lifestyle  · Work with your health care provider and a dietitian to set a weight-loss goal that is healthy and reasonable for you.  · Limit your screen time.  · Find ways to reward yourself that do not involve food.  · Do not drink alcohol if:  ? Your health care provider tells you not to drink.  ? You are pregnant, may be pregnant, or are planning to become pregnant.  · If you drink alcohol:  ? Limit how much you use to:  § 0-1 drink a day for women.  § 0-2 drinks a day for men.  ? Be aware of how much alcohol is in your drink. In the [place] one drink equals one 12 oz bottle of beer (355 mL), one 5 oz glass of wine (148 mL), or one 1½ oz glass of hard liquor (44 mL).  General instructions  · Keep a weight-loss journal to keep track of the food you eat and how much exercise you get.  · Take over-the-counter and prescription medicines only as told by your health care provider.  · Take vitamins and supplements only as told by your health care provider.  · Consider joining a support group. Your health care provider may be able to recommend a support group.  · Keep all follow-up visits as told by your health care provider. This is important.  Contact a health care provider if:  · You are unable to meet your weight loss goal after 6 weeks of dietary and lifestyle changes.  Get help right away if you are having:  · Trouble breathing.  · Suicidal thoughts or behaviors.  Summary  · Obesity is  the condition of having too much total body fat.  · Being overweight or obese means that your weight is greater than what is considered healthy for your body size.  · Work with your health care provider and a dietitian to set a weight-loss goal that is healthy and reasonable for you.  · Exercise regularly, as told by your health care provider. Ask your health care provider what types of exercise are safe for you and how often you should exercise.  This information is not intended to replace advice given to you by your health care provider. Make sure you discuss any questions you have with your health care provider.  Document Revised: 08/22/2019 Document Reviewed: 08/22/2019  Elsevier Patient Education © 2021 [employer]

## 2021-09-22 NOTE — PROGRESS NOTES
Sleep Clinic Follow-Up    CHIEF COMPLAINT: follow-up sleep testing    LAST OV: 08/23/2021    HPI:  The patient is a 31 y.o. male.  I discussed the results of the recent home sleep study performed on 09/08/2021. Total presumed sleep time was 6 hrs 50 minutes.  The AHI/JOSÉ LUIS was 7. Mean O2 was 95% with a toña of 88%. Snoring was present.       INTERVAL MEDICAL HISTORY: No change since last office visit in regard to the patient's bedtime routine, medications, or diagnosis.    PAP Data:  Currently not on therapy   Mask type: mask fitting per DME  DME: [facility]       MEDICATIONS:   Current Outpatient Medications:   •  cetirizine (zyrTEC) 10 MG tablet, Take 10 mg by mouth Daily., Disp: , Rfl:   •  citalopram (CeleXA) 10 MG tablet, Take 10 mg by mouth Daily., Disp: , Rfl:   •  fluticasone (FLONASE) 50 MCG/ACT nasal spray, 2 sprays into the nostril(s) as directed by provider Daily., Disp: , Rfl:   •  ubrogepant (ubrogepant) 50 MG tablet, as needed, Disp: , Rfl:     PHYSICAL EXAM  Vitals:    09/22/21 0837   BP: 122/71   Pulse: 67   SpO2: 98%           09/22/21  0837   Weight: 105 kg (231 lb 9.6 oz)       Body mass index is 30.56 kg/m². Patient's Body mass index is 30.56 kg/m². indicating that he is obese (BMI >30). Obesity-related health conditions include the following: obstructive sleep apnea. Obesity is unchanged. BMI is is above average; BMI management plan is completed. We discussed portion control and increasing exercise..      Gen:  No acute distress heard in voice, alert, oriented  Eyes:    Moist conjunctiva, EOMI   Lungs:  Normal effort, non-labored breathing  Heart:  No lower extremity edema   Psych:  Appropriate affect   Neuro:  Cn2-12 appear intact     Assessment and Plan:    1. Mild obstructive sleep apnea - Established, stable (1)  1. Start on auto CPAP 5-20 cm H2O with mask fitting per DME and PAP supplies   2. Continue PAP as prescribed.   3. Monitor compliance report   4. Drowsy driving tips- do not drive if  feeling sleepy   5. Return to clinic in 12 weeks with compliance report, or sooner if needed       The sleep results were discussed in detail with the patient.  The risks of untreated sleep apnea were reviewed.  Treatment options for sleep apnea were discussed in detail. He wants to pursue PAP therapy. I counseled patient on PAP maintenance, compliance, and management, sleep hygiene, including regular sleep wake schedule and stimulus control therapy, the importance of weight reduction, safe driving and abstaining from smoking and alcohol consumption, as well as other sedatives.       All of the patient's questions were answered. He states understanding and agreement with my assessment and plan as above.     I obtained a brief history from the patient, reviewed the medical problems and current medications, and made medical decisions regarding treatment based on that information. Total visit time 15 min, with total of 10 minutes spent counseling patient regarding: PAP therapy, PAP compliance, PAP maintenance, Lifestyle modifications, Sleep hygiene and Study results.       Patient to follow up in 31-90 days with compliance report   He agrees to return sooner on a prn basis if sx change.           This document has been electronically signed by ANGELICA Darling on September 22, 2021 08:39 CDT            CC: Ankit Espinal MD          No ref. provider found

## 2022-07-07 PROCEDURE — 87635 SARS-COV-2 COVID-19 AMP PRB: CPT | Performed by: NURSE PRACTITIONER

## 2023-02-09 ENCOUNTER — OFFICE VISIT (OUTPATIENT)
Dept: FAMILY MEDICINE CLINIC | Facility: CLINIC | Age: 33
End: 2023-02-09
Payer: COMMERCIAL

## 2023-02-09 VITALS
SYSTOLIC BLOOD PRESSURE: 114 MMHG | WEIGHT: 233 LBS | HEART RATE: 86 BPM | DIASTOLIC BLOOD PRESSURE: 72 MMHG | BODY MASS INDEX: 30.88 KG/M2 | HEIGHT: 73 IN | OXYGEN SATURATION: 97 %

## 2023-02-09 DIAGNOSIS — G43.101 MIGRAINE WITH AURA AND WITH STATUS MIGRAINOSUS, NOT INTRACTABLE: ICD-10-CM

## 2023-02-09 DIAGNOSIS — F41.9 ANXIETY: ICD-10-CM

## 2023-02-09 DIAGNOSIS — Z11.59 NEED FOR HEPATITIS C SCREENING TEST: ICD-10-CM

## 2023-02-09 DIAGNOSIS — E78.2 MIXED HYPERLIPIDEMIA: ICD-10-CM

## 2023-02-09 DIAGNOSIS — Z13.29 SCREENING FOR HYPOTHYROIDISM: ICD-10-CM

## 2023-02-09 DIAGNOSIS — Z76.89 ENCOUNTER TO ESTABLISH CARE: ICD-10-CM

## 2023-02-09 DIAGNOSIS — Z00.00 ANNUAL PHYSICAL EXAM: Primary | ICD-10-CM

## 2023-02-09 PROBLEM — G43.109 MIGRAINE WITH AURA: Status: ACTIVE | Noted: 2023-02-09

## 2023-02-09 PROCEDURE — 99395 PREV VISIT EST AGE 18-39: CPT | Performed by: NURSE PRACTITIONER

## 2023-02-09 RX ORDER — VENLAFAXINE HYDROCHLORIDE 75 MG/1
75 CAPSULE, EXTENDED RELEASE ORAL DAILY
COMMUNITY
End: 2023-02-09 | Stop reason: DRUGHIGH

## 2023-02-09 RX ORDER — VENLAFAXINE HYDROCHLORIDE 150 MG/1
150 CAPSULE, EXTENDED RELEASE ORAL DAILY
Qty: 30 CAPSULE | Refills: 1 | Status: SHIPPED | OUTPATIENT
Start: 2023-02-09 | End: 2023-03-16 | Stop reason: SDUPTHER

## 2023-02-09 NOTE — PROGRESS NOTES
"Chief Complaint  Establish Care (New PT )    Subjective  Encounter to establish care.  Annual physical exam.  Has anxiety, migraines and high cholesterol.  Currently on Effexor for anxiety.  \"I can feel that I am becoming more irritable at home because of work and I do not think that is fair to my family.\"        Yves Acevedo presents to [facility] - [place]  History of Present Illness  HPI: Encounter to establish care.  Annual physical exam.  Anxiety  Presents for initial visit. Onset was 1 to 5 years ago. The problem has been waxing and waning. Symptoms include irritability, muscle tension and nervous/anxious behavior. Patient reports no chest pain. Symptoms occur most days. The severity of symptoms is moderate. The symptoms are aggravated by work stress. The quality of sleep is fair. Nighttime awakenings: occasional.     There are no known risk factors. His past medical history is significant for anxiety/panic attacks. Past treatments include non-SSRI antidepressants.   Migraine  Headache pattern:  Headache sometimes there, sometimes not at all  Initial event:  Stressful life event  Recent changes:  No recent change in headache pattern  Frequency:  1 to 3 per month  Providers seen:  Primary care provider  Previous testing:  Blood work  Number of hospitalizations for headaches:  0  ADL impact frequency:  Never  Time of day symptoms are worse:  No specific time of day  Days of the week symptoms are worse:  No specific day of the week  Season symptoms are worse:  No particular season  Quality:  Pulsating  Laterality:  Both sides at the same time  Headaches last more than three days?: No    Aggravating factors:  Stress  Preventative medications tried:  Ubrogepant  Hyperlipidemia  This is a chronic problem. The current episode started more than 1 year ago. The problem is controlled. Exacerbating diseases include obesity. Factors aggravating his hyperlipidemia " "include fatty foods. Pertinent negatives include no chest pain. Current antihyperlipidemic treatment includes diet change. The current treatment provides moderate improvement of lipids. There are no compliance problems.  Risk factors for coronary artery disease include dyslipidemia, family history, obesity, male sex and stress.       No current outpatient medications on file prior to visit.     No current facility-administered medications on file prior to visit.     No Known Allergies    Objective   Vital Signs:  /72   Pulse 86   Ht 185.4 cm (73\")   Wt 106 kg (233 lb)   SpO2 97%   BMI 30.74 kg/m²   Estimated body mass index is 30.74 kg/m² as calculated from the following:    Height as of this encounter: 185.4 cm (73\").    Weight as of this encounter: 106 kg (233 lb).         Physical Exam  Vitals and nursing note reviewed.   Constitutional:       Appearance: Normal appearance. He is well-developed. He is obese.   HENT:      Head: Normocephalic and atraumatic.      Right Ear: Tympanic membrane, ear canal and external ear normal.      Left Ear: Tympanic membrane, ear canal and external ear normal.      Nose: Nose normal.      Mouth/Throat:      Mouth: Mucous membranes are moist.      Pharynx: Oropharynx is clear.   Eyes:      Extraocular Movements: Extraocular movements intact.      Conjunctiva/sclera: Conjunctivae normal.      Pupils: Pupils are equal, round, and reactive to light.   Cardiovascular:      Rate and Rhythm: Normal rate and regular rhythm.      Pulses: Normal pulses.      Heart sounds: Normal heart sounds.   Pulmonary:      Effort: Pulmonary effort is normal.      Breath sounds: Normal breath sounds.   Abdominal:      General: Bowel sounds are normal.      Palpations: Abdomen is soft.   Musculoskeletal:         General: Normal range of motion.      Cervical back: Normal range of motion and neck supple.   Skin:     General: Skin is warm.      Capillary Refill: Capillary refill takes less than 2 " seconds.   Neurological:      Mental Status: He is alert and oriented to person, place, and time.   Psychiatric:         Behavior: Behavior normal.        Result Review :                   Assessment and Plan   Diagnoses and all orders for this visit:    1. Annual physical exam (Primary)    2. Anxiety  -     CBC & Differential; Future  -     Comprehensive Metabolic Panel; Future  -     venlafaxine XR (Effexor XR) 150 MG 24 hr capsule; Take 1 capsule by mouth Daily.  Dispense: 30 capsule; Refill: 1    3. Migraine with aura and with status migrainosus, not intractable  -     ubrogepant (UBRELVY) 50 MG tablet; Take 1 tablet by mouth As Needed (Migraine).  Dispense: 16 tablet; Refill: 2    4. Mixed hyperlipidemia  -     Lipid panel; Future    5. Need for hepatitis C screening test  -     Hepatitis C antibody; Future    6. Screening for hypothyroidism  -     TSH; Future    7. Encounter to establish care      1.  Annual physical exam:  Continue on current medications as previously prescribed   Counseling on importance of heathy eating habits and regular physical activity regimen on improving overall physical and mental health.     2.  Anxiety:  Complete CBC and chemistry panel as ordered, notify of results when available  Increase Effexor from 75 mg p.o. daily to 150 mg p.o. daily  Will follow-up in this office in 1 month for reevaluation of symptoms    3  Migraine with aura and with status migrainous, not intractable:  Continue Ubrelvy as previously prescribed and refill prescription sent to pharmacy    4.  Mixed hyperlipidemia:  Complete fasting lipid panel as ordered will notify of results when available  Adhere to a diet low in saturated fats    5.  Need for hepatitis C screening test:  Complete hepatitis C antibody as ordered will notify of results when available    6.  Screening for hypothyroidism:  Complete TSH as ordered will notify of results when available    7.  Encounter to establish care:         Follow Up    Return in about 4 weeks (around 3/9/2023) for Recheck.  Patient was given instructions and counseling regarding his condition or for health maintenance advice. Please see specific information pulled into the AVS if appropriate.         This document has been electronically signed by ANGELICA Garcia on February 19, 2023 14:23 CST

## 2023-03-16 ENCOUNTER — OFFICE VISIT (OUTPATIENT)
Dept: FAMILY MEDICINE CLINIC | Facility: CLINIC | Age: 33
End: 2023-03-16
Payer: COMMERCIAL

## 2023-03-16 VITALS
BODY MASS INDEX: 30.28 KG/M2 | SYSTOLIC BLOOD PRESSURE: 116 MMHG | WEIGHT: 228.5 LBS | DIASTOLIC BLOOD PRESSURE: 74 MMHG | HEART RATE: 75 BPM | OXYGEN SATURATION: 98 % | HEIGHT: 73 IN

## 2023-03-16 DIAGNOSIS — F41.9 ANXIETY: Primary | ICD-10-CM

## 2023-03-16 PROCEDURE — 99213 OFFICE O/P EST LOW 20 MIN: CPT | Performed by: NURSE PRACTITIONER

## 2023-03-16 RX ORDER — VENLAFAXINE HYDROCHLORIDE 150 MG/1
150 CAPSULE, EXTENDED RELEASE ORAL DAILY
Qty: 90 CAPSULE | Refills: 3 | Status: SHIPPED | OUTPATIENT
Start: 2023-03-16

## 2023-03-16 NOTE — PROGRESS NOTES
"Chief Complaint  Anxiety    Subjective   One month follow-up for anxiety.  Had Effexor increased at previous visit. \"I feel a million times better.\"        Yves Acevedo presents to [facility] - [place]  Anxiety  Presents for follow-up visit. Onset was 1 to 5 years ago. The problem has been gradually improving. Symptoms include irritability, muscle tension and nervous/anxious behavior. Symptoms occur occasionally. The severity of symptoms is moderate. The symptoms are aggravated by work stress. The quality of sleep is fair. Nighttime awakenings: occasional.     There are no known risk factors. His past medical history is significant for anxiety/panic attacks. Past treatments include non-SSRI antidepressants.     Current Outpatient Medications on File Prior to Visit   Medication Sig Dispense Refill   • ubrogepant (UBRELVY) 50 MG tablet Take 1 tablet by mouth As Needed (Migraine). 16 tablet 2   • [DISCONTINUED] venlafaxine XR (Effexor XR) 150 MG 24 hr capsule Take 1 capsule by mouth Daily. 30 capsule 1     No current facility-administered medications on file prior to visit.     No Known Allergies    Objective   Vital Signs:  /74   Pulse 75   Ht 185.4 cm (73\")   Wt 104 kg (228 lb 8 oz)   SpO2 98%   BMI 30.15 kg/m²   Estimated body mass index is 30.15 kg/m² as calculated from the following:    Height as of this encounter: 185.4 cm (73\").    Weight as of this encounter: 104 kg (228 lb 8 oz).         Physical Exam  Vitals and nursing note reviewed.   Constitutional:       Appearance: Normal appearance. He is well-developed. He is obese.   HENT:      Head: Normocephalic.   Cardiovascular:      Rate and Rhythm: Normal rate and regular rhythm.      Heart sounds: Normal heart sounds.   Pulmonary:      Effort: Pulmonary effort is normal.      Breath sounds: Normal breath sounds.   Abdominal:      General: Bowel sounds are normal.      Palpations: Abdomen is soft. "   Musculoskeletal:         General: Normal range of motion.      Cervical back: Normal range of motion and neck supple.   Skin:     General: Skin is warm.      Capillary Refill: Capillary refill takes less than 2 seconds.   Neurological:      Mental Status: He is alert and oriented to person, place, and time.   Psychiatric:         Behavior: Behavior normal.        Result Review :                   Assessment and Plan   Diagnoses and all orders for this visit:    1. Anxiety (Primary)  -     venlafaxine XR (Effexor XR) 150 MG 24 hr capsule; Take 1 capsule by mouth Daily.  Dispense: 90 capsule; Refill: 3      1.  Anxiety:  Continue Effexor as previously prescribed and refill RX sent to pharmacy          Follow Up   Return in about 6 months (around 9/16/2023) for Recheck.  Patient was given instructions and counseling regarding his condition or for health maintenance advice. Please see specific information pulled into the AVS if appropriate.         This document has been electronically signed by ANGELICA Garcia on March 16, 2023 08:00 CDT

## 2023-08-14 ENCOUNTER — OFFICE VISIT (OUTPATIENT)
Dept: FAMILY MEDICINE CLINIC | Facility: CLINIC | Age: 33
End: 2023-08-14
Payer: COMMERCIAL

## 2023-08-14 VITALS
BODY MASS INDEX: 28.97 KG/M2 | WEIGHT: 218.6 LBS | HEIGHT: 73 IN | DIASTOLIC BLOOD PRESSURE: 76 MMHG | OXYGEN SATURATION: 98 % | SYSTOLIC BLOOD PRESSURE: 122 MMHG | HEART RATE: 69 BPM

## 2023-08-14 DIAGNOSIS — F41.9 ANXIETY: Primary | ICD-10-CM

## 2023-08-14 DIAGNOSIS — F33.2 SEVERE EPISODE OF RECURRENT MAJOR DEPRESSIVE DISORDER, WITHOUT PSYCHOTIC FEATURES: ICD-10-CM

## 2023-08-14 PROCEDURE — 99213 OFFICE O/P EST LOW 20 MIN: CPT | Performed by: NURSE PRACTITIONER
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
## 2023-08-18 ENCOUNTER — TELEPHONE (OUTPATIENT)
Dept: FAMILY MEDICINE CLINIC | Facility: CLINIC | Age: 33
End: 2023-08-18
Payer: COMMERCIAL

## 2023-08-18 NOTE — TELEPHONE ENCOUNTER
Left message to call me back.    ----- Message from ANGELICA Garcia sent at 8/18/2023 11:26 AM CDT -----  Please call and let him know that I did get the results of his GeneSight testing back and it does show that Effexor is a good choice of medication based on his genetics.  Have him continue the Effexor at the [facility] and I will see him back in office on the August 28 for further evaluation.  If not effective we can discuss medication changes at that time based on GeneSight results

## 2023-08-18 NOTE — TELEPHONE ENCOUNTER
-Per ANGELICA Nelson, Mr. Faustin has been called with recent lab results and recommendations.  Please continue your current medications and follow-up as planned or sooner if any problems    --- Message from ANGELICA Garcia sent at 8/18/2023 11:26 AM CDT -----  Please call and let him know that I did get the results of his GeneSight testing back and it does show that Effexor is a good choice of medication based on his genetics.  Have him continue the Effexor at the [facility] and I will see him back in office on the August 28 for further evaluation.  If not effective we can discuss medication changes at that time based on GeneSight results

## 2023-08-28 ENCOUNTER — OFFICE VISIT (OUTPATIENT)
Dept: FAMILY MEDICINE CLINIC | Facility: CLINIC | Age: 33
End: 2023-08-28
Payer: COMMERCIAL

## 2023-08-28 VITALS
WEIGHT: 222.4 LBS | OXYGEN SATURATION: 96 % | HEART RATE: 84 BPM | SYSTOLIC BLOOD PRESSURE: 104 MMHG | BODY MASS INDEX: 29.48 KG/M2 | DIASTOLIC BLOOD PRESSURE: 72 MMHG | HEIGHT: 73 IN

## 2023-08-28 DIAGNOSIS — F33.2 SEVERE EPISODE OF RECURRENT MAJOR DEPRESSIVE DISORDER, WITHOUT PSYCHOTIC FEATURES: ICD-10-CM

## 2023-08-28 DIAGNOSIS — F41.9 ANXIETY: Primary | ICD-10-CM

## 2023-08-28 DIAGNOSIS — R04.0 EPISTAXIS: ICD-10-CM

## 2023-08-28 PROCEDURE — 99214 OFFICE O/P EST MOD 30 MIN: CPT | Performed by: NURSE PRACTITIONER

## 2023-08-28 RX ORDER — CITALOPRAM HYDROBROMIDE 10 MG/1
10 TABLET ORAL DAILY
Qty: 30 TABLET | Refills: 1 | Status: SHIPPED | OUTPATIENT
Start: 2023-08-28

## 2023-08-28 NOTE — PROGRESS NOTES
"Chief Complaint  Anxiety (2 wk f/u )    Subjective        Yves Acevedo presents to [facility] - [place]  Two week follow-up for anxiety and depression.  Started on Vraylar at previous visit.  \"I had to stop taking it because it seemed to make things worse.  I have also cut down on my on Effexor as well.  I am only taking 75 mg right now because it made my hands shake.  I am doing better.  I am still having days with depression and episodes where I still cry sometimes but it is a little better.\"\"  His father is currently on Celexa and his depression has improved.  \"I would also like a referral to see an ENT.  I have had two sinus surgeries and I have a nosebleed at most every day now.\"    Anxiety  Presents for follow-up visit. Symptoms include decreased concentration, depressed mood, excessive worry, irritability, muscle tension, nervous/anxious behavior and restlessness. Symptoms occur most days. The severity of symptoms is moderate. The quality of sleep is fair. Nighttime awakenings: occasional.     His past medical history is significant for depression.   Depression  Visit Type: follow-up  Patient presents with the following symptoms: decreased concentration, depressed mood, excessive worry, feelings of hopelessness, feelings of worthlessness, irritability, muscle tension, nervousness/anxiety and restlessness.  Frequency of symptoms: most days   Severity: moderate   Sleep quality: poor  Nighttime awakenings: several  Compliance with medications:  %    Nose Bleed   The bleeding has been from both nares. This is a chronic problem. The current episode started more than 1 year ago. The problem occurs daily. The problem has been waxing and waning. The bleeding is associated with dry air. He has tried pressure for the symptoms. The treatment provided moderate relief. His past medical history is significant for frequent nosebleeds and sinus problems.     Current " "Outpatient Medications on File Prior to Visit   Medication Sig Dispense Refill    ubrogepant (UBRELVY) 50 MG tablet Take 1 tablet by mouth As Needed (Migraine). 16 tablet 2    venlafaxine XR (Effexor XR) 150 MG 24 hr capsule Take 1 capsule by mouth Daily. 90 capsule 3    [DISCONTINUED] Cariprazine HCl (Vraylar) 1.5 MG capsule capsule Take 1 capsule by mouth Daily. 30 capsule 1     No current facility-administered medications on file prior to visit.     No Known Allergies    Objective   Vital Signs:  /72   Pulse 84   Ht 185.4 cm (73\")   Wt 101 kg (222 lb 6.4 oz)   SpO2 96%   BMI 29.34 kg/mý   Estimated body mass index is 29.34 kg/mý as calculated from the following:    Height as of this encounter: 185.4 cm (73\").    Weight as of this encounter: 101 kg (222 lb 6.4 oz).       Physical Exam  Vitals and nursing note reviewed.   Constitutional:       Appearance: Normal appearance. He is well-developed.   HENT:      Head: Normocephalic.      Nose: Mucosal edema and congestion present.      Right Turbinates: Enlarged.      Left Turbinates: Enlarged.   Cardiovascular:      Rate and Rhythm: Normal rate and regular rhythm.      Heart sounds: Normal heart sounds.   Pulmonary:      Effort: Pulmonary effort is normal.      Breath sounds: Normal breath sounds.   Abdominal:      General: Bowel sounds are normal.      Palpations: Abdomen is soft.   Musculoskeletal:         General: Normal range of motion.      Cervical back: Normal range of motion and neck supple.   Skin:     General: Skin is warm.      Capillary Refill: Capillary refill takes less than 2 seconds.   Neurological:      Mental Status: He is alert and oriented to person, place, and time.   Psychiatric:         Behavior: Behavior normal.      Result Review :                   Assessment and Plan   Diagnoses and all orders for this visit:    1. Anxiety (Primary)  -     citalopram (CeleXA) 10 MG tablet; Take 1 tablet by mouth Daily.  Dispense: 30 tablet; " Refill: 1    2. Severe episode of recurrent major depressive disorder, without psychotic features  -     citalopram (CeleXA) 10 MG tablet; Take 1 tablet by mouth Daily.  Dispense: 30 tablet; Refill: 1    3. Epistaxis  -     Ambulatory Referral to ENT (Otolaryngology)      1.  Anxiety:  Results of GeneSight testing discussed  Begin Celexa as prescribed  Educated on possible side effects of this medication    2.  Severe episode of recurrent major depressive disorder, without psychotic features:  Continue Effexor as previously prescribed  Begin Celexa as discussed above  Seek emergency medical treatment for any new or worsening thoughts of hopelessness, helplessness or    3.  Epistaxis:  Referral placed to ENT will contact him to schedule appointment continue use of Yudith pot as needed       Follow Up   Return in about 4 weeks (around 9/25/2023).  Patient was given instructions and counseling regarding his condition or for health maintenance advice. Please see specific information pulled into the AVS if appropriate.         This document has been electronically signed by ANGELICA Garcia on August 28, 2023 12:44 CDT

## 2023-09-11 DIAGNOSIS — F33.2 SEVERE EPISODE OF RECURRENT MAJOR DEPRESSIVE DISORDER, WITHOUT PSYCHOTIC FEATURES: Primary | ICD-10-CM

## 2023-09-25 ENCOUNTER — OFFICE VISIT (OUTPATIENT)
Dept: OTOLARYNGOLOGY | Facility: CLINIC | Age: 33
End: 2023-09-25

## 2023-09-25 VITALS
SYSTOLIC BLOOD PRESSURE: 123 MMHG | OXYGEN SATURATION: 99 % | HEART RATE: 67 BPM | HEIGHT: 73 IN | WEIGHT: 224 LBS | DIASTOLIC BLOOD PRESSURE: 82 MMHG | BODY MASS INDEX: 29.69 KG/M2

## 2023-09-25 DIAGNOSIS — R04.0 RECURRENT EPISTAXIS: ICD-10-CM

## 2023-09-25 DIAGNOSIS — J34.89 NASAL SEPTAL PERFORATION: Primary | ICD-10-CM

## 2023-09-25 PROCEDURE — 99214 OFFICE O/P EST MOD 30 MIN: CPT | Performed by: OTOLARYNGOLOGY

## 2023-09-25 NOTE — PROGRESS NOTES
Chief complaint: Epistaxis    Assessment and Plan:  33-year-old male with complex nasal surgical history, small anterior perforation causing dryness and epistaxis    -We discussed that a septal button could be used to restore laminar flow and prevent bleeding and crusting, this would not be MRI compatible and we need to have it removed prior to any imaging of this kind, patient is amenable to this plan.  I will order an implant and call him to schedule for placement when it is available    History of present illness:    Mr. Acevedo is a 33-year-old male presents today for evaluation of epistaxis.  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  He states that he is unsure at what point he developed a septal perforation but he does currently have one in this area crusts, and bleeds.  He notes left greater than right mild nasal obstruction that is only bothersome but his main complaint is epistaxis and he is wondering if there is a way to address this.  He is not unhappy with the cosmetic outcome of his nose.  He states that every few days he will have epistaxis, uses tissues in the nose to stop this.  He denies nasal manipulation.  He does not currently use any nasal sprays other than Flonase.    Vital Signs   Vitals:    09/25/23 1418   BP: 123/82   Pulse: 67   SpO2: 99%     Physical Exam:  General: NAD, awake and alert  Head: normocephalic, atraumatic  Eyes: EOMI, sclerae white, conjunctivae pink  Ears: pinnae intact without masses or lesions  Nose: external grossly midline, there is mild columellar droop with slight under rotation of the nasal tip, well-healed columellar scar   Mucosa pink, not edematous. No polyps seen. No purulence.  Mildly dry     Septum: Mildly deviated towards the left with an additional maxillary spur towards the left causing mild narrowing of the left nasal cavity, there is a 8 x 9 mm anterior nasal septal perforation with dried blood evident more on the left than the right without crusting   Turbinates: not hypertrophied  OC/OP: mucosa moist and pink  Neuro: no focal deficits    Results Review:  Referring nurse practitioner note Reviewed today and demonstrates complaints of nosebleeds with a complex history of multiple episodes of epistaxis ENT referral placed for further evaluation    Review of Systems:  Positive ROS items: Epistaxis  Otherwise, a 14 system ROS is negative except as pertinent positives are mentioned above.    Histories:  No Known Allergies    Prior to Admission medications    Medication Sig Start Date End Date Taking? Authorizing Provider   citalopram (CeleXA) 10 MG tablet Take 1 tablet by mouth Daily. 8/28/23  Yes Leslie Vu APRN   ubrogepant (UBRELVY) 50 MG tablet Take 1 tablet by mouth As Needed (Migraine). 2/9/23  Yes Leslie Vu APRN   venlafaxine XR (Effexor XR) 150 MG 24 hr capsule Take 1 capsule by mouth Daily. 3/16/23  Yes Leslie Vu APRN       Past Medical History:   Diagnosis Date    Acquired deformity of nose     ADHD     History of migraine     Hypertrophy of inferior nasal turbinate     Nasal septal deviation     Sinus infection     FREQUENT    Vision decreased     GLASSES       Past Surgical History:   Procedure Laterality Date    SEPTOPLASTY      SEPTOPLASTY, RESECTION INFERIOR TURBINATES Bilateral 11/15/2016    Procedure: SEPTOPLASTY (75660), RESECTION INFERIOR TURBINATES (41486), REPAIR OF NASAL VESTIBULAR STENOSIS WITH OSTEOTOMIES WITH EVALUATION AND LIKELY REPLACEMENT OF  GRAFTS (42375);  Surgeon: Jersey Verma MD;  Location: [facility] OR;  Service:     TONSILLECTOMY         Social History     Socioeconomic History    Marital status:    Tobacco Use    Smoking status: Never     Smokeless tobacco: Never   Substance and Sexual Activity    Alcohol use: Yes     Alcohol/week: 5.0 standard drinks     Types: 5 Cans of beer per week    Drug use: Yes     Types: Marijuana    Sexual activity: Defer       Family History   Problem Relation Age of Onset    Abnormal EKG Mother     Cancer Father        Immunization status not specifically asked and therefore not specifically documented.    Voice dictation disclaimer:  Voice dictation was used in the creation of this note.  As such, there may be typos or inappropriate words throughout the document.  The document is proofread for typos and errors, however some may not be caught.      This document has been electronically signed by Shanta Payne MD on September 25, 2023 15:17 CDT

## 2024-07-01 ENCOUNTER — OFFICE VISIT (OUTPATIENT)
Dept: UROLOGY | Age: 34
End: 2024-07-01
Payer: COMMERCIAL

## 2024-07-01 VITALS — HEIGHT: 73 IN | WEIGHT: 234.4 LBS | BODY MASS INDEX: 31.07 KG/M2 | TEMPERATURE: 98.1 F

## 2024-07-01 DIAGNOSIS — N50.89 EPIDIDYMAL MASS: ICD-10-CM

## 2024-07-01 DIAGNOSIS — N28.89 LEFT RENAL MASS: Primary | ICD-10-CM

## 2024-07-01 DIAGNOSIS — N28.9 KIDNEY DISORDER: ICD-10-CM

## 2024-07-01 DIAGNOSIS — N50.819 PAIN IN TESTICLE, UNSPECIFIED LATERALITY: ICD-10-CM

## 2024-07-01 LAB
APPEARANCE FLUID: CLEAR
BILIRUBIN, POC: NORMAL
BLOOD URINE, POC: NORMAL
CLARITY, POC: CLEAR
COLOR, POC: YELLOW
GLUCOSE URINE, POC: NORMAL
KETONES, POC: NORMAL
LEUKOCYTE EST, POC: NORMAL
NITRITE, POC: NORMAL
PH, POC: 6.5
PROTEIN, POC: NORMAL
SPECIFIC GRAVITY, POC: 1
UROBILINOGEN, POC: 0.2

## 2024-07-01 PROCEDURE — 99204 OFFICE O/P NEW MOD 45 MIN: CPT | Performed by: UROLOGY

## 2024-07-01 PROCEDURE — 81002 URINALYSIS NONAUTO W/O SCOPE: CPT | Performed by: UROLOGY

## 2024-07-01 RX ORDER — ALPRAZOLAM 0.25 MG/1
TABLET ORAL
COMMUNITY
Start: 2024-06-04

## 2024-07-01 RX ORDER — LAMOTRIGINE 25 MG/1
1 TABLET ORAL
COMMUNITY
Start: 2024-06-04

## 2024-07-01 RX ORDER — OLANZAPINE 2.5 MG/1
1 TABLET, FILM COATED ORAL
COMMUNITY
Start: 2024-06-04

## 2024-07-01 RX ORDER — CITALOPRAM 20 MG/1
1 TABLET ORAL
COMMUNITY
Start: 2024-06-04

## 2024-07-01 RX ORDER — VENLAFAXINE HYDROCHLORIDE 75 MG/1
CAPSULE, EXTENDED RELEASE ORAL
COMMUNITY
Start: 2023-01-03

## 2024-07-01 ASSESSMENT — ENCOUNTER SYMPTOMS
DIARRHEA: 0
COUGH: 0
VOMITING: 0
NAUSEA: 0
CONSTIPATION: 0
TROUBLE SWALLOWING: 0
BACK PAIN: 0
EYE REDNESS: 0
ABDOMINAL DISTENTION: 0
SHORTNESS OF BREATH: 0
ABDOMINAL PAIN: 0
EYE DISCHARGE: 0

## 2024-07-01 NOTE — PROGRESS NOTES
with measurements as noted adjacent and separate from the testis.  A adenomatoid tumor cannot be excluded but a fibrous pseudotumor was mentioned I really think this is just a convoluted that has until the epididymis based on my examination          1. Left renal mass  As above I personally viewed the x-rays the mass in the left kidney believe is an incidental to the right testicular pain and nodule certainly is concerning for renal cell carcinoma.  This needs to be further characterized with complete staging workup and labs.  - POCT Urinalysis no Micro  - Comprehensive Metabolic Panel; Future  - CBC with Auto Differential; Future  - CT CHEST W CONTRAST; Future  - CT ABDOMEN PELVIS W WO CONTRAST Additional Contrast? None (renal mass protocol); Future  - NM BONE SCAN WHOLE BODY; Future    2. Kidney disorder    - CT ABDOMEN PELVIS W WO CONTRAST Additional Contrast? None (renal mass protocol); Future    3. Pain in testicle, unspecified laterality  I suspect this is inflammation of the convoluted Vas or tail of the epididymis.  Of asked him to take an NSAID over-the-counter for the next 2 weeks.  - POCT Urinalysis no Micro    4. Epididymal mass, right  Will monitor for improvement certainly if this increases in size he may need excisional biopsy I suspect this is just indurated and inflamed convoluted vas until the epididymis.      Orders Placed This Encounter   Procedures    CT CHEST W CONTRAST     Standing Status:   Future     Standing Expiration Date:   7/1/2025     Order Specific Question:   STAT Creatinine as needed:     Answer:   No     Order Specific Question:   Reason for exam:     Answer:   Left renal mass rule out pulmonary metastasis    CT ABDOMEN PELVIS W WO CONTRAST Additional Contrast? None (renal mass protocol)     Renal mass protocol     Standing Status:   Future     Standing Expiration Date:   7/1/2025     Order Specific Question:   Additional Contrast?     Answer:   None     Comments:   renal mass

## 2024-07-03 ENCOUNTER — TELEPHONE (OUTPATIENT)
Dept: UROLOGY | Age: 34
End: 2024-07-03

## 2024-07-03 NOTE — TELEPHONE ENCOUNTER
Pt called to speak with a nurse regarding wanting to know if it's okay if he get blood work done on Monday. Please advise.

## 2024-07-09 ENCOUNTER — HOSPITAL ENCOUNTER (OUTPATIENT)
Dept: CT IMAGING | Age: 34
Discharge: HOME | End: 2024-07-09
Payer: COMMERCIAL

## 2024-07-09 ENCOUNTER — HOSPITAL ENCOUNTER (OUTPATIENT)
Dept: NUCLEAR MEDICINE | Age: 34
Discharge: HOME | End: 2024-07-11
Payer: COMMERCIAL

## 2024-07-09 DIAGNOSIS — N28.89 LEFT RENAL MASS: ICD-10-CM

## 2024-07-09 DIAGNOSIS — N28.9 KIDNEY DISORDER: ICD-10-CM

## 2024-07-09 LAB
ALBUMIN SERPL-MCNC: 4.3 G/DL (ref 3.5–5.2)
ALP SERPL-CCNC: 93 U/L (ref 40–130)
ALT SERPL-CCNC: 29 U/L (ref 5–41)
ANION GAP SERPL CALCULATED.3IONS-SCNC: 12 MMOL/L (ref 7–19)
AST SERPL-CCNC: 18 U/L (ref 5–40)
BASOPHILS # BLD: 0 K/UL (ref 0–0.2)
BASOPHILS NFR BLD: 0.3 % (ref 0–1)
BILIRUB SERPL-MCNC: 0.3 MG/DL (ref 0.2–1.2)
BUN SERPL-MCNC: 15 MG/DL (ref 6–20)
CALCIUM SERPL-MCNC: 9.3 MG/DL (ref 8.6–10)
CHLORIDE SERPL-SCNC: 100 MMOL/L (ref 98–111)
CO2 SERPL-SCNC: 27 MMOL/L (ref 22–29)
CREAT SERPL-MCNC: 0.9 MG/DL (ref 0.5–1.2)
EOSINOPHIL # BLD: 0.2 K/UL (ref 0–0.6)
EOSINOPHIL NFR BLD: 2.7 % (ref 0–5)
ERYTHROCYTE [DISTWIDTH] IN BLOOD BY AUTOMATED COUNT: 12.2 % (ref 11.5–14.5)
GLUCOSE SERPL-MCNC: 97 MG/DL (ref 74–109)
HCT VFR BLD AUTO: 43.8 % (ref 42–52)
HGB BLD-MCNC: 14.7 G/DL (ref 14–18)
IMM GRANULOCYTES # BLD: 0.1 K/UL
LYMPHOCYTES # BLD: 1.6 K/UL (ref 1.1–4.5)
LYMPHOCYTES NFR BLD: 25.4 % (ref 20–40)
MCH RBC QN AUTO: 28 PG (ref 27–31)
MCHC RBC AUTO-ENTMCNC: 33.6 G/DL (ref 33–37)
MCV RBC AUTO: 83.4 FL (ref 80–94)
MONOCYTES # BLD: 0.8 K/UL (ref 0–0.9)
MONOCYTES NFR BLD: 12 % (ref 0–10)
NEUTROPHILS # BLD: 3.7 K/UL (ref 1.5–7.5)
NEUTS SEG NFR BLD: 58.6 % (ref 50–65)
PLATELET # BLD AUTO: 293 K/UL (ref 130–400)
PMV BLD AUTO: 10.4 FL (ref 9.4–12.4)
POTASSIUM SERPL-SCNC: 3.8 MMOL/L (ref 3.5–5)
PROT SERPL-MCNC: 7.3 G/DL (ref 6.6–8.7)
RBC # BLD AUTO: 5.25 M/UL (ref 4.7–6.1)
SODIUM SERPL-SCNC: 139 MMOL/L (ref 136–145)
WBC # BLD AUTO: 6.3 K/UL (ref 4.8–10.8)

## 2024-07-09 PROCEDURE — 6360000004 HC RX CONTRAST MEDICATION: Performed by: UROLOGY

## 2024-07-09 PROCEDURE — 3430000000 HC RX DIAGNOSTIC RADIOPHARMACEUTICAL: Performed by: UROLOGY

## 2024-07-09 PROCEDURE — 71260 CT THORAX DX C+: CPT

## 2024-07-09 PROCEDURE — 74178 CT ABD&PLV WO CNTR FLWD CNTR: CPT

## 2024-07-09 PROCEDURE — 78306 BONE IMAGING WHOLE BODY: CPT | Performed by: UROLOGY

## 2024-07-09 PROCEDURE — A9503 TC99M MEDRONATE: HCPCS | Performed by: UROLOGY

## 2024-07-09 RX ORDER — TC 99M MEDRONATE 20 MG/10ML
20 INJECTION, POWDER, LYOPHILIZED, FOR SOLUTION INTRAVENOUS
Status: COMPLETED | OUTPATIENT
Start: 2024-07-09 | End: 2024-07-09

## 2024-07-09 RX ADMIN — IOPAMIDOL 75 ML: 755 INJECTION, SOLUTION INTRAVENOUS at 11:41

## 2024-07-09 RX ADMIN — TC 99M MEDRONATE 20 MILLICURIE: 20 INJECTION, POWDER, LYOPHILIZED, FOR SOLUTION INTRAVENOUS at 14:35

## 2024-07-23 ENCOUNTER — PATIENT MESSAGE (OUTPATIENT)
Dept: OTHER | Age: 34
End: 2024-07-23

## 2024-07-29 ENCOUNTER — OFFICE VISIT (OUTPATIENT)
Dept: UROLOGY | Age: 34
End: 2024-07-29
Payer: COMMERCIAL

## 2024-07-29 VITALS — WEIGHT: 234 LBS | BODY MASS INDEX: 31.01 KG/M2 | HEIGHT: 73 IN

## 2024-07-29 DIAGNOSIS — N50.819 PAIN IN TESTICLE, UNSPECIFIED LATERALITY: ICD-10-CM

## 2024-07-29 DIAGNOSIS — N50.89 EPIDIDYMAL MASS: ICD-10-CM

## 2024-07-29 DIAGNOSIS — N28.89 LEFT RENAL MASS: Primary | ICD-10-CM

## 2024-07-29 DIAGNOSIS — R91.1 PULMONARY NODULE, RIGHT: ICD-10-CM

## 2024-07-29 LAB
APPEARANCE FLUID: CLEAR
BILIRUBIN, POC: NORMAL
BLOOD URINE, POC: NORMAL
CLARITY, POC: CLEAR
COLOR, POC: YELLOW
GLUCOSE URINE, POC: NORMAL
KETONES, POC: NORMAL
LEUKOCYTE EST, POC: NORMAL
NITRITE, POC: NORMAL
PH, POC: 6.5
PROTEIN, POC: NORMAL
SPECIFIC GRAVITY, POC: 1.01
UROBILINOGEN, POC: 0.2

## 2024-07-29 PROCEDURE — 81002 URINALYSIS NONAUTO W/O SCOPE: CPT | Performed by: UROLOGY

## 2024-07-29 PROCEDURE — 99214 OFFICE O/P EST MOD 30 MIN: CPT | Performed by: UROLOGY

## 2024-07-29 ASSESSMENT — ENCOUNTER SYMPTOMS
EYE REDNESS: 0
EYE DISCHARGE: 0
NAUSEA: 0
COUGH: 0
VOMITING: 0
CONSTIPATION: 0
SHORTNESS OF BREATH: 0
ABDOMINAL DISTENTION: 0
BACK PAIN: 0
ABDOMINAL PAIN: 0
TROUBLE SWALLOWING: 0
DIARRHEA: 0

## 2024-07-29 NOTE — PROGRESS NOTES
kidneys and bladder demonstrate normal, physiologic activity.  The lower extremeties demonstrate age appropriate activity in the major joints.  The upper extremeties demonstrate age   appropriate levels of activity in the major joints.  The cervical spine demonstrates age appropriate findings.  The calvarium and face demonstrate a normal distribution of activity.     IMPRESSION:      The whole-body bone scan is within normal limits for a patient of this age.        ______________________________________   Electronically signed by: RAJIV MCCOY M.D.  Date:     07/10/2024  Time:    08:02         1. Left renal mass  Heterogeneous solid up appearing mass left kidney.  Concerning for renal cell carcinoma.  It is not amenable to partial nephrectomy therefore recommend complete nephrectomy.  I discussed with the patient doing a left hand-assisted laparoscopic nephrectomy.  Metastatic survey is negative for metastasis he has normal creatinine  & GFR normal contralateral right kidney.  Small 4 mm pulmonary nodule could be monitored and even if it did represent a solitary lung metastasis then this could be resected if it should change or grow.  Therefore I recommended left hand-assisted laparoscopic nephrectomy.  Patient is from [facility] and he has already sought an appointment with a local urologist, Dr. Horowitz so he may want to have the surgery done there.  He did say that [facility] was as close as [place].  I offered him a surgery date on August 20 or 27.  He will let me know whether he wants to proceed or whether he is going be taken care of by an outside urologist closer to his home in [place].  - POCT Urinalysis no Micro    2.  Right epididymal mass.    Certainly appears no change.  Therefore I would recommend watching this with serial examination as I still think it is some induration or inflammation of the convoluted vas.  This does not improve at some point local excision could be accomplished for now

## 2024-08-06 ENCOUNTER — TELEPHONE (OUTPATIENT)
Dept: UROLOGY | Age: 34
End: 2024-08-06

## 2024-08-06 NOTE — TELEPHONE ENCOUNTER
Called to follow up with the patient about his nephrectomy. He was going to see if maybe Dr. Horowitz could do it for him because he is close to home. The patient stated that he is going to have Dr. Horowitz do it because it is closer to home, but he thanks us for following up with him.